# Patient Record
Sex: MALE | Race: BLACK OR AFRICAN AMERICAN | Employment: FULL TIME | URBAN - METROPOLITAN AREA
[De-identification: names, ages, dates, MRNs, and addresses within clinical notes are randomized per-mention and may not be internally consistent; named-entity substitution may affect disease eponyms.]

---

## 2017-06-22 ENCOUNTER — OCC HEALTH (OUTPATIENT)
Dept: OCCUPATIONAL MEDICINE | Age: 27
End: 2017-06-22
Attending: PHYSICIAN ASSISTANT

## 2020-05-14 ENCOUNTER — HOSPITAL ENCOUNTER (OUTPATIENT)
Age: 30
Discharge: HOME | End: 2020-05-14
Attending: FAMILY MEDICINE
Payer: COMMERCIAL

## 2020-05-14 VITALS
SYSTOLIC BLOOD PRESSURE: 134 MMHG | DIASTOLIC BLOOD PRESSURE: 78 MMHG | HEART RATE: 74 BPM | OXYGEN SATURATION: 98 % | TEMPERATURE: 98 F | RESPIRATION RATE: 16 BRPM

## 2020-05-14 DIAGNOSIS — K21.9 GASTROESOPHAGEAL REFLUX DISEASE, ESOPHAGITIS PRESENCE NOT SPECIFIED: Primary | ICD-10-CM

## 2020-05-14 PROCEDURE — 99204 OFFICE O/P NEW MOD 45 MIN: CPT

## 2020-05-14 PROCEDURE — 93005 ELECTROCARDIOGRAM TRACING: CPT

## 2020-05-14 PROCEDURE — 93010 ELECTROCARDIOGRAM REPORT: CPT

## 2020-05-14 RX ORDER — PANTOPRAZOLE SODIUM 40 MG/1
40 TABLET, DELAYED RELEASE ORAL DAILY
Qty: 30 TABLET | Refills: 0 | Status: SHIPPED | OUTPATIENT
Start: 2020-05-14 | End: 2020-06-13

## 2020-05-14 NOTE — ED INITIAL ASSESSMENT (HPI)
Complains of epigastric pain on and off for the last three days. Pain starts after eating. Denies shortness of breath or chest pain. Hx GERD.

## 2020-05-14 NOTE — ED PROVIDER NOTES
Patient Seen in: [facility]      History   Patient presents with:  Epigastric Pain    Stated Complaint: Sesation food stuck In esophagus    HPI  58-year-old gentleman presents with mild upper abdominal discomfort for 3 days now, normal.      Mouth/Throat:      Pharynx: No oropharyngeal exudate. Eyes:      General:         Right eye: No discharge. Left eye: No discharge.       Conjunctiva/sclera: Conjunctivae normal.      Pupils: Pupils are equal, round, and reactive to li Prescribed:  Current Discharge Medication List    START taking these medications    Pantoprazole Sodium 40 MG Oral Tab EC  Take 1 tablet (40 mg total) by mouth daily.   Qty: 30 tablet Refills: 0

## 2020-07-01 ENCOUNTER — OFFICE VISIT (OUTPATIENT)
Dept: INTERNAL MEDICINE CLINIC | Facility: CLINIC | Age: 30
End: 2020-07-01
Payer: COMMERCIAL

## 2020-07-01 VITALS
HEIGHT: 72 IN | RESPIRATION RATE: 18 BRPM | TEMPERATURE: 98 F | WEIGHT: 194 LBS | HEART RATE: 60 BPM | DIASTOLIC BLOOD PRESSURE: 76 MMHG | OXYGEN SATURATION: 97 % | SYSTOLIC BLOOD PRESSURE: 118 MMHG | BODY MASS INDEX: 26.28 KG/M2

## 2020-07-01 DIAGNOSIS — Z13.1 SCREENING FOR DIABETES MELLITUS: ICD-10-CM

## 2020-07-01 DIAGNOSIS — Z13.0 SCREENING FOR ENDOCRINE, NUTRITIONAL, METABOLIC AND IMMUNITY DISORDER: ICD-10-CM

## 2020-07-01 DIAGNOSIS — Z13.29 SCREENING FOR ENDOCRINE, NUTRITIONAL, METABOLIC AND IMMUNITY DISORDER: ICD-10-CM

## 2020-07-01 DIAGNOSIS — Z13.89 SCREENING FOR GENITOURINARY CONDITION: ICD-10-CM

## 2020-07-01 DIAGNOSIS — Z13.220 SCREENING FOR LIPID DISORDERS: ICD-10-CM

## 2020-07-01 DIAGNOSIS — M54.50 LUMBAR BACK PAIN: ICD-10-CM

## 2020-07-01 DIAGNOSIS — Z00.00 ANNUAL PHYSICAL EXAM: Primary | ICD-10-CM

## 2020-07-01 DIAGNOSIS — Z13.21 SCREENING FOR ENDOCRINE, NUTRITIONAL, METABOLIC AND IMMUNITY DISORDER: ICD-10-CM

## 2020-07-01 DIAGNOSIS — Z13.228 SCREENING FOR ENDOCRINE, NUTRITIONAL, METABOLIC AND IMMUNITY DISORDER: ICD-10-CM

## 2020-07-01 PROCEDURE — 99385 PREV VISIT NEW AGE 18-39: CPT | Performed by: NURSE PRACTITIONER

## 2020-07-01 RX ORDER — CYCLOBENZAPRINE HCL 10 MG
10 TABLET ORAL 3 TIMES DAILY PRN
Qty: 20 TABLET | Refills: 0 | Status: SHIPPED | OUTPATIENT
Start: 2020-07-01 | End: 2020-10-01

## 2020-07-01 RX ORDER — PREDNISONE 20 MG/1
40 TABLET ORAL DAILY
Qty: 14 TABLET | Refills: 0 | Status: SHIPPED | OUTPATIENT
Start: 2020-07-01 | End: 2020-07-08

## 2020-07-01 NOTE — PROGRESS NOTES
Wellness Exam    CC: Patient is presenting for a wellness exam    HPI:   Current Complaints: New to our office. Hypertension has been controlled. He is experiencing lumbar back pain after chiropractor did adjustment a week ago.  He has been taking OTC medic diarrhea. Genitourinary: Negative for urgency, frequency and difficulty urinating. Musculoskeletal: Negative for arthralgias and gait problem. Skin: Negative for color change and rash. Neurological: Negative for tremors, weakness and numbness.    He cancer screening, labs, safety, immunizations were discussed with the patient and ordered as follows:    Lumbar back pain- take prednisone as directed, use muscle relaxer as needed.  Follow up in 2 weeks    HTN- controlled, would like to come off medication

## 2020-07-16 ENCOUNTER — LAB ENCOUNTER (OUTPATIENT)
Dept: LAB | Age: 30
End: 2020-07-16
Attending: NURSE PRACTITIONER
Payer: COMMERCIAL

## 2020-07-16 DIAGNOSIS — Z13.0 SCREENING FOR ENDOCRINE, NUTRITIONAL, METABOLIC AND IMMUNITY DISORDER: ICD-10-CM

## 2020-07-16 DIAGNOSIS — Z13.89 SCREENING FOR GENITOURINARY CONDITION: ICD-10-CM

## 2020-07-16 DIAGNOSIS — Z13.228 SCREENING FOR ENDOCRINE, NUTRITIONAL, METABOLIC AND IMMUNITY DISORDER: ICD-10-CM

## 2020-07-16 DIAGNOSIS — Z13.29 SCREENING FOR ENDOCRINE, NUTRITIONAL, METABOLIC AND IMMUNITY DISORDER: ICD-10-CM

## 2020-07-16 DIAGNOSIS — Z13.1 SCREENING FOR DIABETES MELLITUS: ICD-10-CM

## 2020-07-16 DIAGNOSIS — Z13.21 SCREENING FOR ENDOCRINE, NUTRITIONAL, METABOLIC AND IMMUNITY DISORDER: ICD-10-CM

## 2020-07-16 DIAGNOSIS — Z13.220 SCREENING FOR LIPID DISORDERS: ICD-10-CM

## 2020-07-16 LAB
ALBUMIN SERPL-MCNC: 3.8 G/DL (ref 3.4–5)
ALBUMIN/GLOB SERPL: 1.2 {RATIO} (ref 1–2)
ALP LIVER SERPL-CCNC: 46 U/L (ref 45–117)
ALT SERPL-CCNC: 24 U/L (ref 16–61)
ANION GAP SERPL CALC-SCNC: 5 MMOL/L (ref 0–18)
AST SERPL-CCNC: 18 U/L (ref 15–37)
BASOPHILS # BLD AUTO: 0.03 X10(3) UL (ref 0–0.2)
BASOPHILS NFR BLD AUTO: 0.3 %
BILIRUB SERPL-MCNC: 1 MG/DL (ref 0.1–2)
BILIRUB UR QL STRIP.AUTO: NEGATIVE
BUN BLD-MCNC: 17 MG/DL (ref 7–18)
BUN/CREAT SERPL: 12.5 (ref 10–20)
CALCIUM BLD-MCNC: 8.6 MG/DL (ref 8.5–10.1)
CHLORIDE SERPL-SCNC: 101 MMOL/L (ref 98–112)
CHOLEST SMN-MCNC: 202 MG/DL (ref ?–200)
CLARITY UR REFRACT.AUTO: CLEAR
CO2 SERPL-SCNC: 30 MMOL/L (ref 21–32)
COLOR UR AUTO: YELLOW
CREAT BLD-MCNC: 1.36 MG/DL (ref 0.7–1.3)
DEPRECATED RDW RBC AUTO: 47.4 FL (ref 35.1–46.3)
EOSINOPHIL # BLD AUTO: 0.06 X10(3) UL (ref 0–0.7)
EOSINOPHIL NFR BLD AUTO: 0.6 %
ERYTHROCYTE [DISTWIDTH] IN BLOOD BY AUTOMATED COUNT: 14.6 % (ref 11–15)
EST. AVERAGE GLUCOSE BLD GHB EST-MCNC: 111 MG/DL (ref 68–126)
GLOBULIN PLAS-MCNC: 3.3 G/DL (ref 2.8–4.4)
GLUCOSE BLD-MCNC: 75 MG/DL (ref 70–99)
GLUCOSE UR STRIP.AUTO-MCNC: NEGATIVE MG/DL
HBA1C MFR BLD HPLC: 5.5 % (ref ?–5.7)
HCT VFR BLD AUTO: 46.7 % (ref 39–53)
HDLC SERPL-MCNC: 68 MG/DL (ref 40–59)
HGB BLD-MCNC: 14.7 G/DL (ref 13–17.5)
IMM GRANULOCYTES # BLD AUTO: 0.02 X10(3) UL (ref 0–1)
IMM GRANULOCYTES NFR BLD: 0.2 %
LDLC SERPL CALC-MCNC: 116 MG/DL (ref ?–100)
LEUKOCYTE ESTERASE UR QL STRIP.AUTO: NEGATIVE
LYMPHOCYTES # BLD AUTO: 3.14 X10(3) UL (ref 1–4)
LYMPHOCYTES NFR BLD AUTO: 31.6 %
M PROTEIN MFR SERPL ELPH: 7.1 G/DL (ref 6.4–8.2)
MCH RBC QN AUTO: 28.1 PG (ref 26–34)
MCHC RBC AUTO-ENTMCNC: 31.5 G/DL (ref 31–37)
MCV RBC AUTO: 89.3 FL (ref 80–100)
MONOCYTES # BLD AUTO: 0.76 X10(3) UL (ref 0.1–1)
MONOCYTES NFR BLD AUTO: 7.6 %
NEUTROPHILS # BLD AUTO: 5.93 X10 (3) UL (ref 1.5–7.7)
NEUTROPHILS # BLD AUTO: 5.93 X10(3) UL (ref 1.5–7.7)
NEUTROPHILS NFR BLD AUTO: 59.7 %
NITRITE UR QL STRIP.AUTO: NEGATIVE
NONHDLC SERPL-MCNC: 134 MG/DL (ref ?–130)
OSMOLALITY SERPL CALC.SUM OF ELEC: 282 MOSM/KG (ref 275–295)
PATIENT FASTING Y/N/NP: YES
PATIENT FASTING Y/N/NP: YES
PH UR STRIP.AUTO: 5 [PH] (ref 4.5–8)
PLATELET # BLD AUTO: 292 10(3)UL (ref 150–450)
POTASSIUM SERPL-SCNC: 3.7 MMOL/L (ref 3.5–5.1)
PROT UR STRIP.AUTO-MCNC: NEGATIVE MG/DL
RBC # BLD AUTO: 5.23 X10(6)UL (ref 4.3–5.7)
RBC UR QL AUTO: NEGATIVE
SODIUM SERPL-SCNC: 136 MMOL/L (ref 136–145)
SP GR UR STRIP.AUTO: 1.02 (ref 1–1.03)
TRIGL SERPL-MCNC: 89 MG/DL (ref 30–149)
TSI SER-ACNC: 2.17 MIU/ML (ref 0.36–3.74)
UROBILINOGEN UR STRIP.AUTO-MCNC: <2 MG/DL
VLDLC SERPL CALC-MCNC: 18 MG/DL (ref 0–30)
WBC # BLD AUTO: 9.9 X10(3) UL (ref 4–11)

## 2020-07-16 PROCEDURE — 36415 COLL VENOUS BLD VENIPUNCTURE: CPT | Performed by: NURSE PRACTITIONER

## 2020-07-16 PROCEDURE — 80050 GENERAL HEALTH PANEL: CPT | Performed by: NURSE PRACTITIONER

## 2020-07-16 PROCEDURE — 80061 LIPID PANEL: CPT | Performed by: NURSE PRACTITIONER

## 2020-07-16 PROCEDURE — 81003 URINALYSIS AUTO W/O SCOPE: CPT | Performed by: NURSE PRACTITIONER

## 2020-07-16 PROCEDURE — 83036 HEMOGLOBIN GLYCOSYLATED A1C: CPT | Performed by: NURSE PRACTITIONER

## 2020-10-01 ENCOUNTER — OFFICE VISIT (OUTPATIENT)
Dept: INTERNAL MEDICINE CLINIC | Facility: CLINIC | Age: 30
End: 2020-10-01
Payer: COMMERCIAL

## 2020-10-01 VITALS
HEART RATE: 72 BPM | SYSTOLIC BLOOD PRESSURE: 128 MMHG | TEMPERATURE: 97 F | HEIGHT: 72 IN | OXYGEN SATURATION: 100 % | DIASTOLIC BLOOD PRESSURE: 82 MMHG | WEIGHT: 189 LBS | BODY MASS INDEX: 25.6 KG/M2 | RESPIRATION RATE: 16 BRPM

## 2020-10-01 DIAGNOSIS — K62.89 RECTAL PAIN: Primary | ICD-10-CM

## 2020-10-01 DIAGNOSIS — Z28.21 PNEUMOCOCCAL VACCINATION DECLINED BY PATIENT: ICD-10-CM

## 2020-10-01 DIAGNOSIS — Z28.21 INFLUENZA VACCINATION DECLINED BY PATIENT: ICD-10-CM

## 2020-10-01 PROCEDURE — 3008F BODY MASS INDEX DOCD: CPT | Performed by: INTERNAL MEDICINE

## 2020-10-01 PROCEDURE — 3074F SYST BP LT 130 MM HG: CPT | Performed by: INTERNAL MEDICINE

## 2020-10-01 PROCEDURE — 99214 OFFICE O/P EST MOD 30 MIN: CPT | Performed by: INTERNAL MEDICINE

## 2020-10-01 PROCEDURE — 3079F DIAST BP 80-89 MM HG: CPT | Performed by: INTERNAL MEDICINE

## 2020-10-01 NOTE — PATIENT INSTRUCTIONS
Anatomy of the Digestive System    Food gives the body the energy needed for life. The digestive system breaks food down into basic nutrients that can be used by the body.  The digestive tract is a long, muscular tube that extends from the mouth through t

## 2020-10-01 NOTE — PROGRESS NOTES
HPI:    Patient ID: Yesenia Mancilla is a 31year old male. Rectal Pain  This is a new problem. Episode onset: 1 week. The problem occurs intermittently. The problem has been waxing and waning.  Pertinent negatives include no abdominal pain, change in Soft. Bowel sounds are normal. He exhibits no distension. There is no abdominal tenderness. There is no rebound. Genitourinary: Rectum:      No rectal mass, tenderness, internal hemorrhoid or abnormal anal tone. Skin: He is not diaphoretic.    Nursing

## 2020-11-20 ENCOUNTER — HOSPITAL ENCOUNTER (OUTPATIENT)
Age: 30
Discharge: HOME | End: 2020-11-20
Attending: EMERGENCY MEDICINE
Payer: COMMERCIAL

## 2020-11-20 VITALS
HEART RATE: 57 BPM | SYSTOLIC BLOOD PRESSURE: 127 MMHG | TEMPERATURE: 99 F | DIASTOLIC BLOOD PRESSURE: 65 MMHG | RESPIRATION RATE: 15 BRPM | OXYGEN SATURATION: 98 %

## 2020-11-20 DIAGNOSIS — K64.9 HEMORRHOIDS, UNSPECIFIED HEMORRHOID TYPE: Primary | ICD-10-CM

## 2020-11-20 PROCEDURE — 82272 OCCULT BLD FECES 1-3 TESTS: CPT

## 2020-11-20 PROCEDURE — 99214 OFFICE O/P EST MOD 30 MIN: CPT

## 2020-11-20 PROCEDURE — 99213 OFFICE O/P EST LOW 20 MIN: CPT

## 2020-11-20 PROCEDURE — 82272 OCCULT BLD FECES 1-3 TESTS: CPT | Performed by: EMERGENCY MEDICINE

## 2020-11-20 RX ORDER — HYDROCORTISONE ACETATE PRAMOXINE HCL 2.5; 1 G/100G; G/100G
1 CREAM TOPICAL 3 TIMES DAILY
Qty: 30 G | Refills: 0 | Status: SHIPPED | OUTPATIENT
Start: 2020-11-20 | End: 2022-01-17 | Stop reason: ALTCHOICE

## 2020-11-20 RX ORDER — DOCUSATE SODIUM 100 MG/1
100 CAPSULE, LIQUID FILLED ORAL 2 TIMES DAILY
Qty: 60 CAPSULE | Refills: 0 | Status: SHIPPED | OUTPATIENT
Start: 2020-11-20 | End: 2020-12-20

## 2020-11-20 RX ORDER — DIBUCAINE 0.28 G/28G
1 OINTMENT TOPICAL 3 TIMES DAILY
Qty: 28 G | Refills: 0 | Status: SHIPPED | OUTPATIENT
Start: 2020-11-20 | End: 2020-11-30

## 2020-11-21 NOTE — ED INITIAL ASSESSMENT (HPI)
Pt has a \"growth \" on the left side of his anus,   Small amount or red blood \"watered down\" when he wipes.   He concerned about an abscess or fistula, but may just be a hemorrhoid

## 2020-11-21 NOTE — ED PROVIDER NOTES
Patient Seen in: [facility]      History   Patient presents with:  Anal Problem    Stated Complaint: discomfort in lower abd area x 4 days    HPI    The patient is a 18-year-old male with a history of hemorrhoids back in his 25s, because Current:/65   Pulse 57   Temp 98.8 °F (37.1 °C) (Temporal)   Resp 15   SpO2 98%         Physical Exam    General: Comfortable and well appearing. Alert and oriented in no distress.   Neuro: Grossly normal and symmetric motor strength and sensati [id number] [phone]    Call in 3 days            Medications Prescribed:  Current Discharge Medication List    START taking these medications    Hydrocort-Pramoxine, Perianal, 2.5-1 % External Cream  Apply 1 Application topically 3 (three) times daily

## 2021-01-13 ENCOUNTER — OFFICE VISIT (OUTPATIENT)
Dept: SURGERY | Facility: CLINIC | Age: 31
End: 2021-01-13
Payer: COMMERCIAL

## 2021-01-13 VITALS
HEART RATE: 73 BPM | BODY MASS INDEX: 23.7 KG/M2 | SYSTOLIC BLOOD PRESSURE: 121 MMHG | WEIGHT: 175 LBS | TEMPERATURE: 98 F | DIASTOLIC BLOOD PRESSURE: 73 MMHG | HEIGHT: 72 IN

## 2021-01-13 DIAGNOSIS — K62.89 PERIANAL PAIN: Primary | ICD-10-CM

## 2021-01-13 PROCEDURE — 3078F DIAST BP <80 MM HG: CPT | Performed by: SURGERY

## 2021-01-13 PROCEDURE — 3074F SYST BP LT 130 MM HG: CPT | Performed by: SURGERY

## 2021-01-13 PROCEDURE — 3008F BODY MASS INDEX DOCD: CPT | Performed by: SURGERY

## 2021-01-13 PROCEDURE — 99243 OFF/OP CNSLTJ NEW/EST LOW 30: CPT | Performed by: SURGERY

## 2021-01-13 NOTE — H&P
Trinity Murray is a 31year old male  Patient presents with:  Hemorrhoids: NP referred by Dr. Paris Gallagher for internal hemorrhoids - Pt. states Dr. Paris Gallagher stated that the hemorrhoids were more complicated than ususal that could require surgery.  Pt. c/o pain (s syndrome) Sister      Social History    Tobacco Use      Smoking status: Former Smoker      Smokeless tobacco: Never Used    Alcohol use: Not Currently    Drug use: Yes      Types: Cannabis      Comment: Vapes marijuana      ROS     GENERAL HEALTH: otherwi not confidently feel a chronic fissure.   STUDIES:     Admission on 11/20/2020, Discharged on 11/20/2020   Component Date Value Ref Range Status   • Hemoccult 11/20/2020 Negative  Negative Final       ASSESSMENT   Imp: Perianal pain and history consistent w

## 2021-03-03 ENCOUNTER — OFFICE VISIT (OUTPATIENT)
Dept: SURGERY | Facility: CLINIC | Age: 31
End: 2021-03-03
Payer: COMMERCIAL

## 2021-03-03 VITALS — DIASTOLIC BLOOD PRESSURE: 71 MMHG | HEART RATE: 83 BPM | TEMPERATURE: 97 F | SYSTOLIC BLOOD PRESSURE: 122 MMHG

## 2021-03-03 DIAGNOSIS — K62.89 PERIANAL PAIN: Primary | ICD-10-CM

## 2021-03-03 PROCEDURE — 3074F SYST BP LT 130 MM HG: CPT | Performed by: SURGERY

## 2021-03-03 PROCEDURE — 3078F DIAST BP <80 MM HG: CPT | Performed by: SURGERY

## 2021-03-03 PROCEDURE — 99214 OFFICE O/P EST MOD 30 MIN: CPT | Performed by: SURGERY

## 2021-03-03 NOTE — PROGRESS NOTES
Office Visit Note       Active Problems      No diagnosis found. Chief Complaint   Patient presents with:  Hemorrhoids: est pt - 1 mo f/u hemorrhoid -- States better, but still discomfort. Denies draining or rectal bleeding.  Denies difficulty having B History    Tobacco Use      Smoking status: Former Smoker      Smokeless tobacco: Never Used    Alcohol use: Not Currently    Drug use: Yes      Types: Cannabis      Comment: Vapes marijuana       •  Hydrocort-Pramoxine, Perianal, 2.5-1 % External Cream, A Cardiovascular: Normal rate and regular rhythm. Pulmonary/Chest: Effort normal and breath sounds normal.   Abdominal: Soft. Bowel sounds are normal. He exhibits no distension. There is no abdominal tenderness. There is no rebound.    Musculoskeletal: No

## 2021-05-08 NOTE — ED QUICK NOTES
Report received from Reginald lacy RN. Patient is sleeping on cot in no apparent distress. Patient's breathing is at ease. No needs are expressed at this time.

## 2021-05-08 NOTE — ED QUICK NOTES
Male PCT and this PCT went into patients room to have patient provide urine sample. Patient unable to keep eyes open to provide sample. RN and MD notified.

## 2021-05-08 NOTE — ED PROVIDER NOTES
Patient Seen in: [facility] Emergency Department      History   Patient presents with:  Nausea/Vomiting/Diarrhea  Alcohol Intoxication    Stated Complaint: Vomiting, ETOH    HPI/Subjective:   HPI    69-year-old male presents for evaluation after iveth Exam    General: Lethargic, follows commands, moans of alcohol, extensive vomitus on clothing  HEENT: Atraumatic, normocephalic. Pupils equal reactive. Extraocular motions intact. Neck: Supple  Lungs: Clear to auscultation bilaterally.   Heart: Regular Bag 5/8/21 0214)   Potassium Chloride ER (K-DUR M20) CR tab 40 mEq (40 mEq Oral Given 5/8/21 0310)          Medications   glucose (DEX4) oral liquid 15 g (15 g Oral Given 5/8/21 0311)     Or   dextrose 50 % injection 50 mL ( Intravenous See Alternative 5/8

## 2021-05-08 NOTE — ED NOTES
Awake and alert, clinically sober. Attempting to find a ride home.   Well-appearing Episodes of diarrhea (more than 3 times a day), or if you are unable to tolerate food or fluids

## 2021-05-08 NOTE — ED INITIAL ASSESSMENT (HPI)
Pt arrived via EMS after being found in car with vomit on himself, pt sleeping, stated had 2 drinks tonight. Pt states does not remember what happened tonight or why he was sleeping in car.

## 2021-05-08 NOTE — ED QUICK NOTES
Patient reports his cell phone was in his pants pocket and his pants are not in the bag of belongings handed back to him.  Oilvia Gupta RN, who arrived patient to the ER this morning and she reports patient had vomited on himself, soaking through his pant

## 2021-09-27 ENCOUNTER — APPOINTMENT (OUTPATIENT)
Dept: GENERAL RADIOLOGY | Age: 31
End: 2021-09-27
Attending: EMERGENCY MEDICINE
Payer: COMMERCIAL

## 2021-09-27 ENCOUNTER — HOSPITAL ENCOUNTER (OUTPATIENT)
Age: 31
Discharge: HOME | End: 2021-09-27
Attending: EMERGENCY MEDICINE
Payer: COMMERCIAL

## 2021-09-27 VITALS
BODY MASS INDEX: 26.41 KG/M2 | TEMPERATURE: 98 F | WEIGHT: 195 LBS | HEART RATE: 64 BPM | OXYGEN SATURATION: 97 % | RESPIRATION RATE: 18 BRPM | HEIGHT: 72 IN | DIASTOLIC BLOOD PRESSURE: 75 MMHG | SYSTOLIC BLOOD PRESSURE: 127 MMHG

## 2021-09-27 DIAGNOSIS — S90.122A CONTUSION OF LESSER TOE OF LEFT FOOT WITHOUT DAMAGE TO NAIL, INITIAL ENCOUNTER: Primary | ICD-10-CM

## 2021-09-27 PROCEDURE — 99213 OFFICE O/P EST LOW 20 MIN: CPT

## 2021-09-27 PROCEDURE — 73660 X-RAY EXAM OF TOE(S): CPT | Performed by: EMERGENCY MEDICINE

## 2021-09-27 NOTE — ED PROVIDER NOTES
Patient Seen in: [facility]      History   Patient presents with:   Toe Injury    Stated Complaint: left foot toe injury    Subjective:   HPI    39-year-old male stated that he accidentally dropped a drink bottle on his left foot sustainin (MIN 2 VIEWS), LEFT 4TH (CPT=73660)    Result Date: 9/27/2021  PROCEDURE:  XR TOE(S) (MIN 2 VIEWS), LEFT 4TH (CPT=73660)  TECHNIQUE:  Three views were obtained. COMPARISON:  None.   INDICATIONS:  left foot toe injury  PATIENT STATED HISTORY: (As transcribe

## 2021-11-10 ENCOUNTER — OFFICE VISIT (OUTPATIENT)
Dept: SURGERY | Facility: CLINIC | Age: 31
End: 2021-11-10
Payer: COMMERCIAL

## 2021-11-10 VITALS
DIASTOLIC BLOOD PRESSURE: 78 MMHG | BODY MASS INDEX: 26.41 KG/M2 | HEART RATE: 64 BPM | SYSTOLIC BLOOD PRESSURE: 128 MMHG | HEIGHT: 72 IN | TEMPERATURE: 98 F | WEIGHT: 195 LBS

## 2021-11-10 DIAGNOSIS — K62.5 RECTAL BLEEDING: Primary | ICD-10-CM

## 2021-11-10 DIAGNOSIS — K60.2 ANAL FISSURE: ICD-10-CM

## 2021-11-10 PROCEDURE — 3078F DIAST BP <80 MM HG: CPT | Performed by: SURGERY

## 2021-11-10 PROCEDURE — 99244 OFF/OP CNSLTJ NEW/EST MOD 40: CPT | Performed by: SURGERY

## 2021-11-10 PROCEDURE — 3074F SYST BP LT 130 MM HG: CPT | Performed by: SURGERY

## 2021-11-10 PROCEDURE — 3008F BODY MASS INDEX DOCD: CPT | Performed by: SURGERY

## 2021-11-10 NOTE — H&P
New Patient Visit Note       Active Problems      No diagnosis found. Chief Complaint   Patient presents with:  Anal Problem: Anal Fissure c/o rectal burning an ditching on and off.  He denies pain or bleeding       History of Present Illness   Patient i Outpatient Medications:   •  Hydrocort-Pramoxine, Perianal, 2.5-1 % External Cream, Apply 1 Application topically 3 (three) times daily. , Disp: 30 g, Rfl: 0  •  NIFEdipine ER Osmotic (PROCARDIA XL) 90 MG Oral Tablet 24 Hr, TAKE 1 TABLET BY MOUTH EVERY DAY, exquisitely tender to tolerate exam no external evidence of hemorrhoid      Assessment   Anal pain rule out fissure and rectal bleeding    Plan   Colonoscopy first rule out rectal tumor or polyp will try to examine the anal canal at time of colonoscopy  Fo

## 2021-11-19 RX ORDER — VALSARTAN 320 MG/1
320 TABLET ORAL DAILY
COMMUNITY

## 2021-11-27 ENCOUNTER — EKG ENCOUNTER (OUTPATIENT)
Dept: LAB | Age: 31
End: 2021-11-27
Payer: COMMERCIAL

## 2021-11-27 ENCOUNTER — LAB ENCOUNTER (OUTPATIENT)
Dept: LAB | Age: 31
End: 2021-11-27
Attending: SURGERY
Payer: COMMERCIAL

## 2021-11-27 DIAGNOSIS — Z01.812 ENCOUNTER FOR PREPROCEDURE SCREENING LABORATORY TESTING FOR COVID-19: ICD-10-CM

## 2021-11-27 DIAGNOSIS — K60.2 ANAL FISSURE: ICD-10-CM

## 2021-11-27 DIAGNOSIS — Z20.822 ENCOUNTER FOR PREPROCEDURE SCREENING LABORATORY TESTING FOR COVID-19: ICD-10-CM

## 2021-11-27 PROCEDURE — 36415 COLL VENOUS BLD VENIPUNCTURE: CPT

## 2021-11-27 PROCEDURE — 93010 ELECTROCARDIOGRAM REPORT: CPT | Performed by: INTERNAL MEDICINE

## 2021-11-27 PROCEDURE — 93005 ELECTROCARDIOGRAM TRACING: CPT

## 2021-11-27 PROCEDURE — 80048 BASIC METABOLIC PNL TOTAL CA: CPT

## 2021-11-29 ENCOUNTER — HOSPITAL ENCOUNTER (OUTPATIENT)
Facility: HOSPITAL | Age: 31
Setting detail: HOSPITAL OUTPATIENT SURGERY
Discharge: HOME | End: 2021-11-29
Attending: SURGERY | Admitting: SURGERY
Payer: COMMERCIAL

## 2021-11-29 ENCOUNTER — ANESTHESIA (OUTPATIENT)
Dept: ENDOSCOPY | Facility: HOSPITAL | Age: 31
End: 2021-11-29
Payer: COMMERCIAL

## 2021-11-29 ENCOUNTER — ANESTHESIA EVENT (OUTPATIENT)
Dept: ENDOSCOPY | Facility: HOSPITAL | Age: 31
End: 2021-11-29
Payer: COMMERCIAL

## 2021-11-29 VITALS
WEIGHT: 195 LBS | DIASTOLIC BLOOD PRESSURE: 103 MMHG | HEIGHT: 72 IN | HEART RATE: 73 BPM | OXYGEN SATURATION: 99 % | BODY MASS INDEX: 26.41 KG/M2 | TEMPERATURE: 97 F | RESPIRATION RATE: 16 BRPM | SYSTOLIC BLOOD PRESSURE: 138 MMHG

## 2021-11-29 DIAGNOSIS — Z20.822 ENCOUNTER FOR PREPROCEDURE SCREENING LABORATORY TESTING FOR COVID-19: Primary | ICD-10-CM

## 2021-11-29 DIAGNOSIS — K62.5 RECTAL BLEEDING: ICD-10-CM

## 2021-11-29 DIAGNOSIS — Z01.812 ENCOUNTER FOR PREPROCEDURE SCREENING LABORATORY TESTING FOR COVID-19: Primary | ICD-10-CM

## 2021-11-29 PROCEDURE — 0DJD8ZZ INSPECTION OF LOWER INTESTINAL TRACT, VIA NATURAL OR ARTIFICIAL OPENING ENDOSCOPIC: ICD-10-PCS | Performed by: SURGERY

## 2021-11-29 RX ORDER — SODIUM CHLORIDE, SODIUM LACTATE, POTASSIUM CHLORIDE, CALCIUM CHLORIDE 600; 310; 30; 20 MG/100ML; MG/100ML; MG/100ML; MG/100ML
INJECTION, SOLUTION INTRAVENOUS CONTINUOUS
Status: DISCONTINUED | OUTPATIENT
Start: 2021-11-29 | End: 2021-11-29

## 2021-11-29 RX ORDER — LIDOCAINE HYDROCHLORIDE 10 MG/ML
INJECTION, SOLUTION EPIDURAL; INFILTRATION; INTRACAUDAL; PERINEURAL AS NEEDED
Status: DISCONTINUED | OUTPATIENT
Start: 2021-11-29 | End: 2021-11-29 | Stop reason: SURG

## 2021-11-29 RX ADMIN — LIDOCAINE HYDROCHLORIDE 50 MG: 10 INJECTION, SOLUTION EPIDURAL; INFILTRATION; INTRACAUDAL; PERINEURAL at 15:42:00

## 2021-11-29 RX ADMIN — SODIUM CHLORIDE, SODIUM LACTATE, POTASSIUM CHLORIDE, CALCIUM CHLORIDE: 600; 310; 30; 20 INJECTION, SOLUTION INTRAVENOUS at 16:01:00

## 2021-11-29 NOTE — ANESTHESIA PREPROCEDURE EVALUATION
PRE-OP EVALUATION    Patient Name: Reed David    Admit Diagnosis: Rectal bleeding [K62.5]    Pre-op Diagnosis: Rectal bleeding [K62.5]    COLONOSCOPY    Anesthesia Procedure: COLONOSCOPY (N/A )    Surgeon(s) and Role:     Krista Dickens,  - Pr and patient meets guidelines. Patient has taken beta blockers in last 24 hours.         Plan/risks discussed with: patient                Present on Admission:  **None**

## 2021-11-29 NOTE — ANESTHESIA POSTPROCEDURE EVALUATION
[facility] Patient Status:  Hospital Outpatient Surgery   Age/Gender 27year old male MRN [id number]   Location [id number] [place] [address] Attending Mauricio Agarwal, [address] [facility] Day # 0 PCP MD Brandon Magdaleno

## 2021-11-29 NOTE — H&P
Patient is seen me on 2 prior occasions. He was advised to undergo colonoscopy and anal exam under anesthesia with possible lateral sphincterotomy.   Patient now presents again with new complaint of rectal bleeding states he is concerned that he may have c Tablet 24 Hr, TAKE 1 TABLET BY MOUTH EVERY DAY, Disp: 90 tablet, Rfl: 0  •  Valsartan-Hydrochlorothiazide (DIOVAN HCT) 320-25 MG Oral Tab, Take 1 tablet by mouth daily. , Disp: 90 tablet, Rfl: 0      Review of Systems  The Review of Systems has been reviewe the anal canal at time of colonoscopy  Follow with anal exam under anesthesia possible lateral sphincterotomy discussed with patient postoperative expectations following lateral sphincterotomy  No orders of the defined types were placed in this encounter.

## 2021-11-30 NOTE — OPERATIVE REPORT
[address]    PATIENT'S NAME: Jairon Negrito RODRIGUES   ATTENDING PHYSICIAN: Ada Francis D.O.   OPERATING PHYSICIAN: Ada Francis D.O.   PATIENT ACCOUNT#:   [de-identified]    LOCATION:  ENDO [facility] ENDO POOL ROOMS 20 EDWP [address] #:   [id number]

## 2021-12-01 RX ORDER — ACETAMINOPHEN 500 MG
1000 TABLET ORAL ONCE
Status: CANCELLED | OUTPATIENT
Start: 2021-12-01 | End: 2021-12-01

## 2021-12-06 ENCOUNTER — HOSPITAL ENCOUNTER (OUTPATIENT)
Facility: HOSPITAL | Age: 31
Setting detail: HOSPITAL OUTPATIENT SURGERY
Discharge: HOME | End: 2021-12-06
Attending: SURGERY | Admitting: SURGERY
Payer: COMMERCIAL

## 2021-12-06 ENCOUNTER — ANESTHESIA (OUTPATIENT)
Dept: SURGERY | Facility: HOSPITAL | Age: 31
End: 2021-12-06
Payer: COMMERCIAL

## 2021-12-06 ENCOUNTER — ANESTHESIA EVENT (OUTPATIENT)
Dept: SURGERY | Facility: HOSPITAL | Age: 31
End: 2021-12-06
Payer: COMMERCIAL

## 2021-12-06 VITALS
HEART RATE: 58 BPM | HEIGHT: 72 IN | DIASTOLIC BLOOD PRESSURE: 78 MMHG | RESPIRATION RATE: 16 BRPM | BODY MASS INDEX: 25.08 KG/M2 | OXYGEN SATURATION: 100 % | WEIGHT: 185.19 LBS | TEMPERATURE: 97 F | SYSTOLIC BLOOD PRESSURE: 146 MMHG

## 2021-12-06 DIAGNOSIS — Z20.822 ENCOUNTER FOR PREPROCEDURE SCREENING LABORATORY TESTING FOR COVID-19: Primary | ICD-10-CM

## 2021-12-06 DIAGNOSIS — Z01.812 ENCOUNTER FOR PREPROCEDURE SCREENING LABORATORY TESTING FOR COVID-19: Primary | ICD-10-CM

## 2021-12-06 DIAGNOSIS — K60.2 ANAL FISSURE: ICD-10-CM

## 2021-12-06 PROCEDURE — 88305 TISSUE EXAM BY PATHOLOGIST: CPT | Performed by: SURGERY

## 2021-12-06 PROCEDURE — 0DBQ8ZX EXCISION OF ANUS, VIA NATURAL OR ARTIFICIAL OPENING ENDOSCOPIC, DIAGNOSTIC: ICD-10-PCS | Performed by: SURGERY

## 2021-12-06 RX ORDER — BUPIVACAINE HYDROCHLORIDE AND EPINEPHRINE 5; 5 MG/ML; UG/ML
INJECTION, SOLUTION EPIDURAL; INTRACAUDAL; PERINEURAL AS NEEDED
Status: DISCONTINUED | OUTPATIENT
Start: 2021-12-06 | End: 2021-12-06 | Stop reason: HOSPADM

## 2021-12-06 RX ORDER — SODIUM CHLORIDE, SODIUM LACTATE, POTASSIUM CHLORIDE, CALCIUM CHLORIDE 600; 310; 30; 20 MG/100ML; MG/100ML; MG/100ML; MG/100ML
INJECTION, SOLUTION INTRAVENOUS CONTINUOUS
Status: DISCONTINUED | OUTPATIENT
Start: 2021-12-06 | End: 2021-12-06

## 2021-12-06 RX ORDER — MEPERIDINE HYDROCHLORIDE 25 MG/ML
12.5 INJECTION INTRAMUSCULAR; INTRAVENOUS; SUBCUTANEOUS AS NEEDED
Status: DISCONTINUED | OUTPATIENT
Start: 2021-12-06 | End: 2021-12-06

## 2021-12-06 RX ORDER — ONDANSETRON 2 MG/ML
INJECTION INTRAMUSCULAR; INTRAVENOUS AS NEEDED
Status: DISCONTINUED | OUTPATIENT
Start: 2021-12-06 | End: 2021-12-06 | Stop reason: SURG

## 2021-12-06 RX ORDER — MIDAZOLAM HYDROCHLORIDE 1 MG/ML
1 INJECTION INTRAMUSCULAR; INTRAVENOUS EVERY 5 MIN PRN
Status: DISCONTINUED | OUTPATIENT
Start: 2021-12-06 | End: 2021-12-06

## 2021-12-06 RX ORDER — LABETALOL HYDROCHLORIDE 5 MG/ML
5 INJECTION, SOLUTION INTRAVENOUS EVERY 5 MIN PRN
Status: DISCONTINUED | OUTPATIENT
Start: 2021-12-06 | End: 2021-12-06

## 2021-12-06 RX ORDER — LIDOCAINE HYDROCHLORIDE 10 MG/ML
INJECTION, SOLUTION EPIDURAL; INFILTRATION; INTRACAUDAL; PERINEURAL AS NEEDED
Status: DISCONTINUED | OUTPATIENT
Start: 2021-12-06 | End: 2021-12-06 | Stop reason: SURG

## 2021-12-06 RX ORDER — HYDROCODONE BITARTRATE AND ACETAMINOPHEN 5; 325 MG/1; MG/1
2 TABLET ORAL AS NEEDED
Status: DISCONTINUED | OUTPATIENT
Start: 2021-12-06 | End: 2021-12-06

## 2021-12-06 RX ORDER — ONDANSETRON 2 MG/ML
4 INJECTION INTRAMUSCULAR; INTRAVENOUS AS NEEDED
Status: DISCONTINUED | OUTPATIENT
Start: 2021-12-06 | End: 2021-12-06

## 2021-12-06 RX ORDER — METOCLOPRAMIDE HYDROCHLORIDE 5 MG/ML
INJECTION INTRAMUSCULAR; INTRAVENOUS AS NEEDED
Status: DISCONTINUED | OUTPATIENT
Start: 2021-12-06 | End: 2021-12-06 | Stop reason: SURG

## 2021-12-06 RX ORDER — ROCURONIUM BROMIDE 10 MG/ML
INJECTION, SOLUTION INTRAVENOUS AS NEEDED
Status: DISCONTINUED | OUTPATIENT
Start: 2021-12-06 | End: 2021-12-06 | Stop reason: SURG

## 2021-12-06 RX ORDER — ESMOLOL HYDROCHLORIDE 10 MG/ML
INJECTION INTRAVENOUS AS NEEDED
Status: DISCONTINUED | OUTPATIENT
Start: 2021-12-06 | End: 2021-12-06 | Stop reason: SURG

## 2021-12-06 RX ORDER — ALBUTEROL SULFATE 2.5 MG/3ML
2.5 SOLUTION RESPIRATORY (INHALATION) AS NEEDED
Status: DISCONTINUED | OUTPATIENT
Start: 2021-12-06 | End: 2021-12-06

## 2021-12-06 RX ORDER — DIBUCAINE 0.28 G/28G
1 OINTMENT TOPICAL AS NEEDED
Qty: 1 EACH | Refills: 0 | Status: SHIPPED | OUTPATIENT
Start: 2021-12-06 | End: 2022-01-17 | Stop reason: ALTCHOICE

## 2021-12-06 RX ORDER — DEXAMETHASONE SODIUM PHOSPHATE 4 MG/ML
VIAL (ML) INJECTION AS NEEDED
Status: DISCONTINUED | OUTPATIENT
Start: 2021-12-06 | End: 2021-12-06 | Stop reason: SURG

## 2021-12-06 RX ORDER — MIDAZOLAM HYDROCHLORIDE 1 MG/ML
INJECTION INTRAMUSCULAR; INTRAVENOUS AS NEEDED
Status: DISCONTINUED | OUTPATIENT
Start: 2021-12-06 | End: 2021-12-06 | Stop reason: SURG

## 2021-12-06 RX ORDER — DIBUCAINE 0.28 G/28G
OINTMENT TOPICAL AS NEEDED
Status: DISCONTINUED | OUTPATIENT
Start: 2021-12-06 | End: 2021-12-06 | Stop reason: HOSPADM

## 2021-12-06 RX ORDER — NALOXONE HYDROCHLORIDE 0.4 MG/ML
80 INJECTION, SOLUTION INTRAMUSCULAR; INTRAVENOUS; SUBCUTANEOUS AS NEEDED
Status: DISCONTINUED | OUTPATIENT
Start: 2021-12-06 | End: 2021-12-06

## 2021-12-06 RX ORDER — HYDROCODONE BITARTRATE AND ACETAMINOPHEN 5; 325 MG/1; MG/1
1 TABLET ORAL AS NEEDED
Status: DISCONTINUED | OUTPATIENT
Start: 2021-12-06 | End: 2021-12-06

## 2021-12-06 RX ORDER — ACETAMINOPHEN 500 MG
1000 TABLET ORAL EVERY 6 HOURS PRN
COMMUNITY

## 2021-12-06 RX ORDER — HYDROMORPHONE HYDROCHLORIDE 1 MG/ML
0.4 INJECTION, SOLUTION INTRAMUSCULAR; INTRAVENOUS; SUBCUTANEOUS EVERY 5 MIN PRN
Status: DISCONTINUED | OUTPATIENT
Start: 2021-12-06 | End: 2021-12-06

## 2021-12-06 RX ORDER — HYDROCODONE BITARTRATE AND ACETAMINOPHEN 5; 325 MG/1; MG/1
1-2 TABLET ORAL EVERY 4 HOURS PRN
Qty: 30 TABLET | Refills: 0 | Status: SHIPPED | OUTPATIENT
Start: 2021-12-06 | End: 2022-01-05 | Stop reason: ALTCHOICE

## 2021-12-06 RX ORDER — HEPARIN SODIUM 5000 [USP'U]/ML
5000 INJECTION, SOLUTION INTRAVENOUS; SUBCUTANEOUS ONCE
Status: COMPLETED | OUTPATIENT
Start: 2021-12-06 | End: 2021-12-06

## 2021-12-06 RX ADMIN — ROCURONIUM BROMIDE 50 MG: 10 INJECTION, SOLUTION INTRAVENOUS at 16:50:00

## 2021-12-06 RX ADMIN — METOCLOPRAMIDE HYDROCHLORIDE 10 MG: 5 INJECTION INTRAMUSCULAR; INTRAVENOUS at 16:42:00

## 2021-12-06 RX ADMIN — ESMOLOL HYDROCHLORIDE 50 MG: 10 INJECTION INTRAVENOUS at 16:42:00

## 2021-12-06 RX ADMIN — ONDANSETRON 4 MG: 2 INJECTION INTRAMUSCULAR; INTRAVENOUS at 17:10:00

## 2021-12-06 RX ADMIN — SODIUM CHLORIDE, SODIUM LACTATE, POTASSIUM CHLORIDE, CALCIUM CHLORIDE: 600; 310; 30; 20 INJECTION, SOLUTION INTRAVENOUS at 17:37:00

## 2021-12-06 RX ADMIN — ROCURONIUM BROMIDE 10 MG: 10 INJECTION, SOLUTION INTRAVENOUS at 16:42:00

## 2021-12-06 RX ADMIN — MIDAZOLAM HYDROCHLORIDE 2 MG: 1 INJECTION INTRAMUSCULAR; INTRAVENOUS at 16:35:00

## 2021-12-06 RX ADMIN — ONDANSETRON 4 MG: 2 INJECTION INTRAMUSCULAR; INTRAVENOUS at 16:42:00

## 2021-12-06 RX ADMIN — DEXAMETHASONE SODIUM PHOSPHATE 4 MG: 4 MG/ML VIAL (ML) INJECTION at 16:42:00

## 2021-12-06 RX ADMIN — LIDOCAINE HYDROCHLORIDE 50 MG: 10 INJECTION, SOLUTION EPIDURAL; INFILTRATION; INTRACAUDAL; PERINEURAL at 16:42:00

## 2021-12-06 RX ADMIN — SODIUM CHLORIDE, SODIUM LACTATE, POTASSIUM CHLORIDE, CALCIUM CHLORIDE: 600; 310; 30; 20 INJECTION, SOLUTION INTRAVENOUS at 16:35:00

## 2021-12-06 NOTE — ANESTHESIA PREPROCEDURE EVALUATION
PRE-OP EVALUATION    Patient Name: Sayra Buckner    Admit Diagnosis: Anal fissure [K60.2]    Pre-op Diagnosis: Anal fissure [K60.2]    ANAL EXAMINATION UNDER ANESTHESIA, POSSIBLE LATERAL SPHINCTEROTOMY    Anesthesia Procedure: ANAL EXAMINATION UNDER ENDOSCOPY   • HC IMPLANT EAR TUBES  1993   • OTHER SURGICAL HISTORY  11/2020    growth in mouth      Social History    Tobacco Use      Smoking status: Former Smoker      Smokeless tobacco: Never Used    Alcohol use: Not Currently      Drug use:    Types:

## 2021-12-06 NOTE — ANESTHESIA PROCEDURE NOTES
Airway  Date/Time: 12/6/2021 4:43 PM  Urgency: elective      General Information and Staff    Patient location during procedure: OR  Anesthesiologist: Cydney Saint, MD  Performed: anesthesiologist     Indications and Patient Condition  Indications for airwa

## 2021-12-06 NOTE — H&P
Patient is seen me on 2 prior occasions. Maria A Ugarte was advised to undergo colonoscopy and anal exam under anesthesia with possible lateral sphincterotomy.  Patient now presents again with new complaint of rectal bleeding states he is concerned that he may have c MG Oral Tablet 24 Hr, TAKE 1 TABLET BY MOUTH EVERY DAY, Disp: 90 tablet, Rfl: 0  •  Valsartan-Hydrochlorothiazide (DIOVAN HCT) 320-25 MG Oral Tab, Take 1 tablet by mouth daily. , Disp: 90 tablet, Rfl: 0      Review of Systems  The Review of Systems has been expectations following lateral sphincterotomy  No orders of the defined types were placed in this encounter.

## 2021-12-07 NOTE — ANESTHESIA POSTPROCEDURE EVALUATION
[facility] Patient Status:  Hospital Outpatient Surgery   Age/Gender 27year old male MRN [id number]   Location [address] [facility] Attending Rodney Grady, [address] [facility] Day # 0 PCP Cori Galeazzi, MD Cristela Simmonds

## 2021-12-09 ENCOUNTER — HOSPITAL ENCOUNTER (OUTPATIENT)
Age: 31
Discharge: HOME | End: 2021-12-09
Attending: EMERGENCY MEDICINE
Payer: COMMERCIAL

## 2021-12-09 VITALS
RESPIRATION RATE: 16 BRPM | HEART RATE: 92 BPM | DIASTOLIC BLOOD PRESSURE: 87 MMHG | OXYGEN SATURATION: 99 % | TEMPERATURE: 99 F | SYSTOLIC BLOOD PRESSURE: 152 MMHG

## 2021-12-09 DIAGNOSIS — R20.2 PARESTHESIAS: Primary | ICD-10-CM

## 2021-12-09 PROCEDURE — 99213 OFFICE O/P EST LOW 20 MIN: CPT

## 2021-12-09 PROCEDURE — 80047 BASIC METABLC PNL IONIZED CA: CPT

## 2021-12-09 PROCEDURE — 36415 COLL VENOUS BLD VENIPUNCTURE: CPT

## 2021-12-09 PROCEDURE — 85025 COMPLETE CBC W/AUTO DIFF WBC: CPT | Performed by: EMERGENCY MEDICINE

## 2021-12-09 NOTE — ED PROVIDER NOTES
Patient Seen in: [facility]      History   Patient presents with:  Numbness    Stated Complaint: NUMBNESS IN BOTH FEET    Subjective:   HPI    The patient is a 26-year-old male with a history of hypertension, anal fissure, who had rectal Systems    Positive for stated complaint: NUMBNESS IN BOTH FEET  Other systems are as noted in HPI. Constitutional and vital signs reviewed. All other systems reviewed and negative except as noted above.     Physical Exam     ED Triage Vitals [12/09/2 components:       Result Value    ISTAT BUN 6 (*)     ISTAT Glucose 101 (*)     All other components within normal limits   CBC W AUTO DIFF   POCT CBC          Labs were obtained. MDM          Patient's labs are reassuring.   He has no objective tamie

## 2021-12-09 NOTE — OPERATIVE REPORT
[facility]    PATIENT'S NAME: Hailey Ziegler   ATTENDING PHYSICIAN: Lyle Christensen D.O.   OPERATING PHYSICIAN: Lyle Christensen D.O.   PATIENT ACCOUNT#:   [de-identified]    LOCATION:  PACU [facility] PACU 3 EDWP 10  MEDICAL RECORD #:   [id number]       DATE OF Mildred Howell.  d: 12/08/2021 09:39:50  t: 12/08/2021 20:00:16  [facility] [id number]  /    cc: Rosemarie Mari D.O.

## 2021-12-09 NOTE — ED INITIAL ASSESSMENT (HPI)
C/o bottom of both feet with numbness/burning tingling sensation- feels like pins and needles for 3 days. Slight numbness to both upper thigh for 2.5-3 weeks.   Denies known injury

## 2021-12-13 ENCOUNTER — OFFICE VISIT (OUTPATIENT)
Dept: INTERNAL MEDICINE CLINIC | Facility: CLINIC | Age: 31
End: 2021-12-13
Payer: COMMERCIAL

## 2021-12-13 VITALS
RESPIRATION RATE: 16 BRPM | HEART RATE: 64 BPM | BODY MASS INDEX: 24.79 KG/M2 | TEMPERATURE: 98 F | SYSTOLIC BLOOD PRESSURE: 136 MMHG | DIASTOLIC BLOOD PRESSURE: 84 MMHG | HEIGHT: 72 IN | OXYGEN SATURATION: 98 % | WEIGHT: 183 LBS

## 2021-12-13 DIAGNOSIS — I10 ESSENTIAL HYPERTENSION, BENIGN: ICD-10-CM

## 2021-12-13 DIAGNOSIS — M54.16 LUMBAR RADICULOPATHY: Primary | ICD-10-CM

## 2021-12-13 DIAGNOSIS — Z28.21 INFLUENZA VACCINATION DECLINED BY PATIENT: ICD-10-CM

## 2021-12-13 PROCEDURE — 99214 OFFICE O/P EST MOD 30 MIN: CPT | Performed by: PHYSICIAN ASSISTANT

## 2021-12-13 PROCEDURE — 3075F SYST BP GE 130 - 139MM HG: CPT | Performed by: PHYSICIAN ASSISTANT

## 2021-12-13 PROCEDURE — 3079F DIAST BP 80-89 MM HG: CPT | Performed by: PHYSICIAN ASSISTANT

## 2021-12-13 PROCEDURE — 3008F BODY MASS INDEX DOCD: CPT | Performed by: PHYSICIAN ASSISTANT

## 2021-12-13 RX ORDER — NAPROXEN 500 MG/1
500 TABLET ORAL 2 TIMES DAILY WITH MEALS
Qty: 14 TABLET | Refills: 0 | Status: SHIPPED | OUTPATIENT
Start: 2021-12-13 | End: 2022-01-17 | Stop reason: ALTCHOICE

## 2021-12-13 RX ORDER — CYCLOBENZAPRINE HCL 10 MG
10 TABLET ORAL NIGHTLY
Qty: 7 TABLET | Refills: 0 | Status: SHIPPED | OUTPATIENT
Start: 2021-12-13

## 2021-12-13 NOTE — PATIENT INSTRUCTIONS
Take naproxen twice daily with food  Take cyclobenzaprine nightly as needed for muscle pain. Caution, will cause drowsiness.      Ok to stretch the back gently, as learned in physical therapy

## 2021-12-13 NOTE — PROGRESS NOTES
Subjective:   Patient ID: Aliya Young is a 27year old male. Patient presents with: Follow - Up: Urgent care visit 12/9 for bilateral numbness to feet and legs       Tingling  This is a recurrent problem.  The current episode started in the past 7 tablets by mouth every 4 (four) hours as needed for Pain. (Patient not taking: No sig reported) 30 tablet 0   • dibucaine 1 % External Ointment Apply 1 Application topically as needed for Pain.  (Patient not taking: No sig reported) 1 each 0   • Hydrocort-P encounter. Meds This Visit:  Requested Prescriptions     Signed Prescriptions Disp Refills   • naproxen 500 MG Oral Tab 14 tablet 0     Sig: Take 1 tablet (500 mg total) by mouth 2 (two) times daily with meals.    • cyclobenzaprine 10 MG Oral Tab 7 tab

## 2021-12-15 ENCOUNTER — OFFICE VISIT (OUTPATIENT)
Dept: SURGERY | Facility: CLINIC | Age: 31
End: 2021-12-15

## 2021-12-15 VITALS — TEMPERATURE: 97 F | HEART RATE: 113 BPM | SYSTOLIC BLOOD PRESSURE: 149 MMHG | DIASTOLIC BLOOD PRESSURE: 94 MMHG

## 2021-12-15 DIAGNOSIS — K62.5 RECTAL BLEEDING: Primary | ICD-10-CM

## 2021-12-15 PROCEDURE — 99024 POSTOP FOLLOW-UP VISIT: CPT | Performed by: SURGERY

## 2021-12-15 PROCEDURE — 3080F DIAST BP >= 90 MM HG: CPT | Performed by: SURGERY

## 2021-12-15 PROCEDURE — 3077F SYST BP >= 140 MM HG: CPT | Performed by: SURGERY

## 2021-12-15 NOTE — PROGRESS NOTES
[facility]  Progress Note    Luke Lighter Patient Status:  No patient class for patient encounter    1990 MRN [id number]   Location [facility] [address] [facility] Attending No att. providers found   Hosp Day # 0 PCP Eddie diagnosis, prognosis, and general treatment was explained to the patient and the family.         DO RYLAN Marley General Surgery  12/15/2021  2:53 PM

## 2021-12-15 NOTE — PROGRESS NOTES
Notified patient benign results per Dr. Jakub Zamora.     Future Appointments  12/22/2021 1:45 PM    Alexander Dias DO         [id number]           [facility]

## 2022-01-05 ENCOUNTER — OFFICE VISIT (OUTPATIENT)
Dept: SURGERY | Facility: CLINIC | Age: 32
End: 2022-01-05

## 2022-01-05 VITALS — TEMPERATURE: 97 F

## 2022-01-05 DIAGNOSIS — K60.2 ANAL FISSURE: Primary | ICD-10-CM

## 2022-01-05 PROCEDURE — 99024 POSTOP FOLLOW-UP VISIT: CPT | Performed by: SURGERY

## 2022-01-05 NOTE — PROGRESS NOTES
Patient seen and examined he denies complaints states he is taking no pain medications. States the perianal pain he was experiencing prior to surgery was resolved almost instantly following the surgery. He claims minor drainage from the area.   Physical e

## 2022-01-17 ENCOUNTER — OFFICE VISIT (OUTPATIENT)
Dept: INTERNAL MEDICINE CLINIC | Facility: CLINIC | Age: 32
End: 2022-01-17
Payer: COMMERCIAL

## 2022-01-17 VITALS
BODY MASS INDEX: 25.47 KG/M2 | RESPIRATION RATE: 12 BRPM | SYSTOLIC BLOOD PRESSURE: 150 MMHG | DIASTOLIC BLOOD PRESSURE: 90 MMHG | HEART RATE: 94 BPM | TEMPERATURE: 99 F | WEIGHT: 188 LBS | OXYGEN SATURATION: 97 % | HEIGHT: 72 IN

## 2022-01-17 DIAGNOSIS — M54.16 LUMBAR RADICULOPATHY: ICD-10-CM

## 2022-01-17 DIAGNOSIS — M54.50 CHRONIC MIDLINE LOW BACK PAIN WITHOUT SCIATICA: Primary | ICD-10-CM

## 2022-01-17 DIAGNOSIS — I10 ESSENTIAL HYPERTENSION, BENIGN: ICD-10-CM

## 2022-01-17 DIAGNOSIS — G89.29 CHRONIC MIDLINE LOW BACK PAIN WITHOUT SCIATICA: Primary | ICD-10-CM

## 2022-01-17 PROCEDURE — 3008F BODY MASS INDEX DOCD: CPT | Performed by: PHYSICIAN ASSISTANT

## 2022-01-17 PROCEDURE — 3077F SYST BP >= 140 MM HG: CPT | Performed by: PHYSICIAN ASSISTANT

## 2022-01-17 PROCEDURE — 3080F DIAST BP >= 90 MM HG: CPT | Performed by: PHYSICIAN ASSISTANT

## 2022-01-17 PROCEDURE — 99214 OFFICE O/P EST MOD 30 MIN: CPT | Performed by: PHYSICIAN ASSISTANT

## 2022-01-17 RX ORDER — GABAPENTIN 300 MG/1
300 CAPSULE ORAL NIGHTLY
Qty: 14 CAPSULE | Refills: 0 | Status: SHIPPED | OUTPATIENT
Start: 2022-01-17

## 2022-01-17 NOTE — PATIENT INSTRUCTIONS
Take gabapentin at bedtime for pain  Continue valsartan for blood pressure  Schedule lumbar spine x-ray; we will contact you about results and ordering your MRI

## 2022-01-17 NOTE — PROGRESS NOTES
Subjective:   Patient ID: Francine Dow is a 27year old male. Low Back Pain  This is a chronic problem. The current episode started more than 1 year ago. The problem occurs constantly. The problem has been gradually worsening since onset.  The [place] sounds. No murmur heard. Pulmonary:      Effort: Pulmonary effort is normal.      Breath sounds: Normal breath sounds. Musculoskeletal:      Lumbar back: Bony tenderness present. No swelling or spasms. Normal range of motion.  Negative right straight

## 2022-08-04 ENCOUNTER — OFFICE VISIT (OUTPATIENT)
Dept: INTERNAL MEDICINE CLINIC | Facility: CLINIC | Age: 32
End: 2022-08-04

## 2022-08-04 VITALS
BODY MASS INDEX: 26.95 KG/M2 | RESPIRATION RATE: 16 BRPM | HEART RATE: 66 BPM | WEIGHT: 199 LBS | OXYGEN SATURATION: 98 % | HEIGHT: 72 IN | DIASTOLIC BLOOD PRESSURE: 88 MMHG | SYSTOLIC BLOOD PRESSURE: 128 MMHG | TEMPERATURE: 98 F

## 2022-08-04 DIAGNOSIS — M79.644 PAIN OF RIGHT THUMB: ICD-10-CM

## 2022-08-04 DIAGNOSIS — M25.531 RIGHT WRIST PAIN: Primary | ICD-10-CM

## 2022-08-04 RX ORDER — NAPROXEN 500 MG/1
500 TABLET ORAL 2 TIMES DAILY WITH MEALS
Qty: 28 TABLET | Refills: 0 | Status: SHIPPED | OUTPATIENT
Start: 2022-08-04 | End: 2022-08-18

## 2022-08-18 ENCOUNTER — HOSPITAL ENCOUNTER (EMERGENCY)
Facility: HOSPITAL | Age: 32
Discharge: HOME | End: 2022-08-18
Attending: EMERGENCY MEDICINE

## 2022-08-18 ENCOUNTER — APPOINTMENT (OUTPATIENT)
Dept: GENERAL RADIOLOGY | Facility: HOSPITAL | Age: 32
End: 2022-08-18
Attending: EMERGENCY MEDICINE

## 2022-08-18 VITALS
SYSTOLIC BLOOD PRESSURE: 185 MMHG | HEIGHT: 72 IN | OXYGEN SATURATION: 100 % | DIASTOLIC BLOOD PRESSURE: 122 MMHG | HEART RATE: 89 BPM | TEMPERATURE: 98 F | BODY MASS INDEX: 26.82 KG/M2 | RESPIRATION RATE: 15 BRPM | WEIGHT: 198 LBS

## 2022-08-18 DIAGNOSIS — M25.531 RIGHT WRIST PAIN: Primary | ICD-10-CM

## 2022-08-18 PROCEDURE — 99283 EMERGENCY DEPT VISIT LOW MDM: CPT

## 2022-08-18 PROCEDURE — 73110 X-RAY EXAM OF WRIST: CPT | Performed by: EMERGENCY MEDICINE

## 2022-08-18 NOTE — ED INITIAL ASSESSMENT (HPI)
Pt here with c/o R wrist pain x 1 month, states was supposed to have xray outpatient but just lost his insurance.

## 2024-11-13 ENCOUNTER — OFFICE VISIT (OUTPATIENT)
Dept: INTERNAL MEDICINE CLINIC | Facility: CLINIC | Age: 34
End: 2024-11-13
Payer: COMMERCIAL

## 2024-11-13 VITALS
HEIGHT: 72 IN | TEMPERATURE: 98 F | BODY MASS INDEX: 28.71 KG/M2 | HEART RATE: 70 BPM | WEIGHT: 212 LBS | SYSTOLIC BLOOD PRESSURE: 150 MMHG | RESPIRATION RATE: 16 BRPM | OXYGEN SATURATION: 98 % | DIASTOLIC BLOOD PRESSURE: 100 MMHG

## 2024-11-13 DIAGNOSIS — S99.911D INJURY OF RIGHT ANKLE, SUBSEQUENT ENCOUNTER: ICD-10-CM

## 2024-11-13 DIAGNOSIS — Z00.00 ROUTINE PHYSICAL EXAMINATION: Primary | ICD-10-CM

## 2024-11-13 DIAGNOSIS — Z72.0 TOBACCO USE: ICD-10-CM

## 2024-11-13 DIAGNOSIS — Z00.00 LABORATORY EXAM ORDERED AS PART OF ROUTINE GENERAL MEDICAL EXAMINATION: ICD-10-CM

## 2024-11-13 DIAGNOSIS — Z11.3 ROUTINE SCREENING FOR STI (SEXUALLY TRANSMITTED INFECTION): ICD-10-CM

## 2024-11-13 DIAGNOSIS — I10 ESSENTIAL HYPERTENSION, BENIGN: ICD-10-CM

## 2024-11-13 DIAGNOSIS — T36.95XA ADVERSE REACTION TO ANTIBIOTIC: ICD-10-CM

## 2024-11-13 RX ORDER — NAPROXEN 500 MG/1
500 TABLET ORAL 2 TIMES DAILY WITH MEALS
Qty: 28 TABLET | Refills: 0 | Status: SHIPPED | OUTPATIENT
Start: 2024-11-13

## 2024-11-13 NOTE — PROGRESS NOTES
Wellness Exam    CC: Patient is presenting for a wellness exam    HPI:   Current Complaints: patient here to reestablish care  c/o right ankle swelling, pain, stiffness.   Originally injured at work 9/23 when a cart ran into the back of his leg. Was having ankle pain. Was on light duty.  Then had dysuria, went to a walk in clinic and given cipro for e.coli on urine culture.   He took cipro for 2 days then felt worsened ankle pain, so he stopped the cipro. States this was around 11/1.  Meanwhile his urine test came back positive for gonorrhea. Pt was given ceftriaxone injection at the walk in clinic for treatment.  Pt states his right ankle is worse than ever. Denies any further trauma. Feels unsteady while walking, unable to tolerate work at [employer] due to the pain and swelling. No tx tried.    Pt reports good compliance on valsartan. He does not monitor his blood pressure    Colon cancer screening:  No recommendations at this time   Prostate cancer screening:  There are no preventive care reminders to display for this patient.     Pertinent Family History:   Family History   Problem Relation Age of Onset    Hypertension Paternal Grandfather     Hypertension Paternal Grandmother     Other (COPD) Paternal Grandmother     Other (downs syndrome) Sister       Past Medical History:    COVID-19    Loss of taste/smell, minor chest pain, no lingering symptoms, no hospitalization    Esophageal reflux    Essential hypertension    High blood pressure    Hypertension    Right arm fracture     Past Surgical History:   Procedure Laterality Date    Colonoscopy N/A 11/29/2021    Procedure: COLONOSCOPY;  Surgeon: David Saldaña DO;  Location:  ENDOSCOPY    Hc implant ear tubes  1993    Other surgical history  11/2020    growth in mouth      Social History     Socioeconomic History    Marital status: Single   Occupational History    Occupation: Student   Tobacco Use    Smoking status: Former    Smokeless tobacco: Never   Vaping Use     Vaping status: Former    Substances: THC   Substance and Sexual Activity    Alcohol use: Not Currently    Drug use: Yes     Types: Cannabis     Comment: marijuana   Other Topics Concern    Caffeine Concern Yes     Comment: 2 to 3 per week     Exercise No     Social Drivers of Health     Financial Resource Strain: Not on File (2023)    Received from [facility]    Financial Resource Strain     Financial Resource Strain: 0   Food Insecurity: Not on File (2024)    Received from [facility]    Food Insecurity     Food: 0   Transportation Needs: Not on File (2023)    Received from [facility]    Transportation Needs     Transportation: 0   Physical Activity: Not on File (2023)    Received from [facility]    Physical Activity     Physical Activity: 0   Stress: Not on File (2023)    Received from [facility]    Stress     Stress: 0   Social Connections: Not on File (2024)    Received from [facility]    Social Connections     Connectedness: 0   Housing Stability: Not on File (2023)    Received from [facility]    Housing Stability     Housin     Medications Ordered Prior to Encounter[1]  Tobacco:  He smoked tobacco in the past but quit greater than 12 months ago.  Social History     Tobacco Use   Smoking Status Former   Smokeless Tobacco Never           Review of Systems   Constitutional: Negative for fever, chills and fatigue.   HENT: Negative for hearing loss, congestion, sore throat and neck pain.    Eyes: Negative for pain and visual disturbance.   Respiratory: Negative for cough and shortness of breath.    Cardiovascular: Negative for chest pain and palpitations.   Gastrointestinal: Negative for nausea, vomiting, abdominal pain and diarrhea.   Genitourinary: Negative for urgency, frequency and difficulty urinating.   Musculoskeletal: Negative for arthralgias and gait problem.   Skin: Negative for color change and rash.   Neurological: Negative for tremors, weakness and numbness.    Hematological: Negative for adenopathy. Does not bruise/bleed easily.   Psychiatric/Behavioral: Negative for confusion and agitation. The patient is not nervous/anxious.      BP (!) 150/100   Pulse 70   Temp 97.8 °F (36.6 °C)   Resp 16   Ht 6' (1.829 m)   Wt 212 lb (96.2 kg)   SpO2 98%   BMI 28.75 kg/m²   Physical Exam   Constitutional: He is oriented to person, place, and time. He appears well-developed. No distress.   HENT: Normocephalic and atraumatic. Nose normal. TMs pearly gray, + light reflex.  Mucous membranes moist, dentition normal.  Oropharynx without erythema, exudate or tonsillar hypertrophy  Eyes: EOM are normal. Pupils are equal, round, and reactive to light. No scleral icterus.   Neck: Normal range of motion. No thyromegaly present.   Cardiovascular: Normal rate, regular rhythm and normal heart sounds.  Exam reveals no friction rub, no murmur heard.  Pulmonary/Chest: Effort normal and breath sounds normal b/l. He has no wheezes or rales.   Abdominal: Soft. Bowel sounds are normal. There is no tenderness. No HSM.  Abdominal aorta normal in size, no hernia appreciated.  Musculoskeletal:   Right ankle + medial and lateral soft tissue swelling. No bony deformity   +tenderness medial malleolus and surrounding soft tissue  Achilles tendon is intact. No swelling of the achilles tendon noted; it is not tender.  + pain with flexion and inversion of the ankle.     Lymphadenopathy: He has no cervical or supraclavicular adenopathy.   Neurological: He is alert and oriented to person, place, and time.  DTRs +2 and symmetric b/l.   Skin: Skin is warm. No rash noted. No erythema, pallor or jaundice.   Psychiatric: He has a normal mood and affect. His behavior is normal.       Assessment and Plan:  Zeny Briones is a 34 year old male here for a wellness exam  Age appropriate cancer screening, labs, safety, immunizations were discussed with the patient and ordered as follows:  1. Routine physical  examination (Primary)  2. Laboratory exam ordered as part of routine general medical examination  -     CBC With Differential With Platelet  -     Comp Metabolic Panel (14)  -     Lipid Panel  -     TSH W Reflex To Free T4  -     Urinalysis, Routine  3. Routine screening for STI (sexually transmitted infection)  Treated for gonorrhea 2 wks ago, ceftriaxone IM x 1. Recheck gonorrhea, chlamydia screening r/o reinfection in 3 mos  -     Chlamydia/Gc Amplification; Future; Expected date: 01/01/2025  4. Injury of right ankle, subsequent encounter  Check XR today  RICE, ACE wrap, see podiatry  -     XR ANKLE (MIN 3 VIEWS), RIGHT (CPT=73610); Future; Expected date: 11/13/2024  -     Podiatry Referral  -     Naproxen; Take 1 tablet (500 mg total) by mouth 2 (two) times daily with meals.  Dispense: 28 tablet; Refill: 0  5. Adverse reaction to antibiotic  Worsened ankle injury after fluoroquinolone exposure. Pt off of antibiotic now. Establish with podiatry r/o tendon rupture. Patient reassured today that his achilles is intact on exam. Avoid exacerbating activities.  -     Podiatry Referral   6. Essential hypertension  Poorly controled. Monitor at home with cuff. Continue valsartan, start naproxen as above for pain control, f/up 2 wks recheck BP    Return in about 2 weeks (around 11/27/2024).   Patient/Caregiver Education:  Patient/Caregiver Education: There are no barriers to learning. Medical education done.  Outcome: Patient verbalizes understanding.      Educated by: RENARD         [1]   Current Outpatient Medications on File Prior to Visit   Medication Sig Dispense Refill    valsartan 320 MG Oral Tab Take 1 tablet (320 mg total) by mouth daily.       No current facility-administered medications on file prior to visit.

## 2024-11-13 NOTE — PATIENT INSTRUCTIONS
Complete x-ray today  Rest, elevate the ankle, use ACE wrap for compression if desired  Schedule with podiatrist  Take naproxen up to twice daily with food for 14 days. Caution, may cause stomach upset or heartburn.  Monitor blood pressure at home. Contact us if BP remains elevated (over 140/90) and we will adjust your medication.

## 2024-11-14 ENCOUNTER — HOSPITAL ENCOUNTER (OUTPATIENT)
Dept: GENERAL RADIOLOGY | Age: 34
Discharge: HOME | End: 2024-11-14
Attending: PHYSICIAN ASSISTANT
Payer: COMMERCIAL

## 2024-11-14 ENCOUNTER — MED REC SCAN ONLY (OUTPATIENT)
Dept: INTERNAL MEDICINE CLINIC | Facility: CLINIC | Age: 34
End: 2024-11-14

## 2024-11-14 ENCOUNTER — TELEPHONE (OUTPATIENT)
Dept: INTERNAL MEDICINE CLINIC | Facility: CLINIC | Age: 34
End: 2024-11-14

## 2024-11-14 DIAGNOSIS — S99.911D INJURY OF RIGHT ANKLE, SUBSEQUENT ENCOUNTER: ICD-10-CM

## 2024-11-14 PROCEDURE — 73610 X-RAY EXAM OF ANKLE: CPT | Performed by: PHYSICIAN ASSISTANT

## 2024-11-19 NOTE — TELEPHONE ENCOUNTER
Received Family Medical Leave Act and disability form in the forms department. Per RN on 11/14 forms were completed by office. Archived.

## 2024-12-10 NOTE — TELEPHONE ENCOUNTER
Attempted to contact [employer], was directed to alternative representative informed above representative was with other client. Spoke to candido Hoover. Requesting completed claim form and 11/13/24 office notes to be faxed. Both faxed and placed to scan bin.

## 2024-12-10 NOTE — TELEPHONE ENCOUNTER
Patient states Quinton from [employer] company is trying to reach us and we are not calling back.  He is seeking additional information in order to complete claim and get patient paid. He is requesting a call today on his direct line so he can finish the claim, [employer] direct  line, [phone], ext [phone].

## 2024-12-14 NOTE — PROGRESS NOTES
Zeny Briones is a 34 year old male.  HPI:   HPI   Pt presents today for right ankle pain after sustained an injury at work on 9/23 when a cart ran into the back of his leg. Pt was also prescribed Cipro for dysuria and the pain worsen after two day use; pt stopped the medication. Meanwhile the urine test cam back positive for gonorrhea and was treated with ceftriaxone injection at the walk in clinic.  Pt had FMLA paperwork filled out, now needs short term disability forms filled out. Pt has not been able to work since.   Ankle still causing pain when going down the stairs. Swelling resolved. Pt had Xray completed which showed:  Mild soft tissue swelling surrounds the ankle best seen over the lateral malleolus.  The ankle mortise is intact.  A discrete fracture is not clearly identified.   Will be seeing a podiatrist this week.    HTN- ran out of the medication two days ago. Will be going to  the medication after this visit. Denies headaches, vision changes.  Pt states when he monitors BP at home systolic in range of 120-140, diastolic in range of 80's.  Current Outpatient Medications   Medication Sig Dispense Refill    HYDROcodone-acetaminophen 5-325 MG Oral Tab TAKE 1 TABLET BY MOUTH EVERY 6 HOURS AS NEEDED FOR SEVERE PAIN FOR UP TO 3 DAYS      cyclobenzaprine 10 MG Oral Tab Take 1 tablet (10 mg total) by mouth 3 (three) times daily as needed for Muscle spasms.      naproxen 500 MG Oral Tab Take 1 tablet (500 mg total) by mouth 2 (two) times daily with meals. 28 tablet 0    valsartan 320 MG Oral Tab Take 1 tablet (320 mg total) by mouth daily.        Past Medical History:    COVID-19    Loss of taste/smell, minor chest pain, no lingering symptoms, no hospitalization    Esophageal reflux    Essential hypertension    High blood pressure    Hypertension    Right arm fracture      Social History:  Social History     Socioeconomic History    Marital status: Single   Occupational History    Occupation:  Student   Tobacco Use    Smoking status: Former    Smokeless tobacco: Never   Vaping Use    Vaping status: Former    Substances: THC   Substance and Sexual Activity    Alcohol use: Not Currently    Drug use: Yes     Types: Cannabis     Comment: marijuana   Other Topics Concern    Caffeine Concern Yes     Comment: 2 to 3 per week     Exercise No     Social Drivers of Health     Financial Resource Strain: Not on File (2023)    Received from [facility]    Financial Resource Strain     Financial Resource Strain: 0   Food Insecurity: Not on File (2024)    Received from [facility]    Food Insecurity     Food: 0   Transportation Needs: Not on File (2023)    Received from [facility]    Transportation Needs     Transportation: 0   Physical Activity: Not on File (2023)    Received from [facility]    Physical Activity     Physical Activity: 0   Stress: Not on File (2023)    Received from [facility]    Stress     Stress: 0   Social Connections: Not on File (2024)    Received from [facility]    Social Connections     Connectedness: 0   Housing Stability: Not on File (2023)    Received from [facility]    Housing Stability     Housin        REVIEW OF SYSTEMS:   GENERAL HEALTH: feels well otherwise. Denies fever, chills, unintentional weight change  SKIN: denies any unusual skin lesions or rashes  RESPIRATORY: denies shortness of breath with exertion, denies cough or wheezing  CARDIOVASCULAR: denies chest pain or palpitations, denies leg swelling  GI: denies abdominal pain and denies heartburn. Denies nausea, vomiting, diarrhea, constipation  NEURO: denies headaches, dizziness, weakness, syncope  PSYCH: denies anxiety, depression, insomnia    EXAM:   BP (!) 170/110   Pulse 79   Temp 97 °F (36.1 °C) (Temporal)   Resp 18   Ht 6' (1.829 m)   Wt 214 lb (97.1 kg)   SpO2 100%   BMI 29.02 kg/m²   GENERAL: well developed, well nourished,in no apparent distress  SKIN: no rashes,no suspicious  lesions, warm and dry  HEENT: atraumatic, normocephalic,ears and throat are clear  NECK: supple,no adenopathy, no thyromegaly  LUNGS: clear to auscultation b/l no W/R/R  CARDIO: RRR without murmur  GI: good BS's,no masses, HSM, distension or tenderness  EXTREMITIES: no cyanosis, clubbing or edema  MUSCULOSKELETAL: FROM, no joint swelling or bony tenderness. + left ankle medial tenderness.   NEURO: a/ox3, no focal deficits  PSYCH: mood and affect normal    ASSESSMENT AND PLAN:     Encounter Diagnoses   Name Primary?    Injury of right ankle, subsequent encounter  -work forms will be sent to the form department  -f/u with podiatry as scheduled Yes    Essential hypertension, benign  -uncontrolled now due to patient has not taken his medication for two days. Pt will be going to  the medication after this visit. Advised to monitor BP at home and f/u in 2wks.      Requested Prescriptions      No prescriptions requested or ordered in this encounter         The patient indicates understanding of these issues and agrees to the plan.  The patient is asked to return in 2 wks.

## 2024-12-16 ENCOUNTER — OFFICE VISIT (OUTPATIENT)
Dept: INTERNAL MEDICINE CLINIC | Facility: CLINIC | Age: 34
End: 2024-12-16
Payer: COMMERCIAL

## 2024-12-16 ENCOUNTER — TELEPHONE (OUTPATIENT)
Dept: INTERNAL MEDICINE CLINIC | Facility: CLINIC | Age: 34
End: 2024-12-16

## 2024-12-16 VITALS
BODY MASS INDEX: 28.99 KG/M2 | HEART RATE: 79 BPM | HEIGHT: 72 IN | WEIGHT: 214 LBS | SYSTOLIC BLOOD PRESSURE: 170 MMHG | RESPIRATION RATE: 18 BRPM | TEMPERATURE: 97 F | OXYGEN SATURATION: 100 % | DIASTOLIC BLOOD PRESSURE: 110 MMHG

## 2024-12-16 DIAGNOSIS — S99.911D INJURY OF RIGHT ANKLE, SUBSEQUENT ENCOUNTER: Primary | ICD-10-CM

## 2024-12-16 DIAGNOSIS — I10 ESSENTIAL HYPERTENSION, BENIGN: ICD-10-CM

## 2024-12-16 RX ORDER — HYDROCODONE BITARTRATE AND ACETAMINOPHEN 5; 325 MG/1; MG/1
TABLET ORAL
COMMUNITY
Start: 2024-11-23

## 2024-12-16 RX ORDER — CYCLOBENZAPRINE HCL 10 MG
10 TABLET ORAL 3 TIMES DAILY PRN
COMMUNITY
Start: 2024-11-23

## 2024-12-16 NOTE — TELEPHONE ENCOUNTER
Papers received for short term disability pt paid $25.00 processing fee by [employer]    Forms emailed and placed in-house mail,

## 2024-12-16 NOTE — TELEPHONE ENCOUNTER
Pt has appt today with MADDIE Keyes for short term disability paperwork completion. Closing encounter.

## 2024-12-17 ENCOUNTER — MED REC SCAN ONLY (OUTPATIENT)
Dept: INTERNAL MEDICINE CLINIC | Facility: CLINIC | Age: 34
End: 2024-12-17

## 2024-12-30 NOTE — TELEPHONE ENCOUNTER
Stehpy,    Please sign off on form if you agree to: disability 10/31/24- pending re-eval on 1/6/25    -Signature page will be the first page scanned  -From your Inbasket, Highlight the patient and click Chart   -Double click the 12/16/24 Forms Completion telephone encounter  -Scroll down to the Media section   -Click the blue Hyperlink: Adele Abel 12/30/24  -Click Acknowledge located in the top right ribbon/menu   -Drag the mouse into the blank space of the document and a + sign will appear. Left click to   electronically sign the document.  -Once signed, simply exit out of the screen and you signature will be saved.     Thank you,    Ashanti dorsey

## 2024-12-31 NOTE — TELEPHONE ENCOUNTER
Dr Larson called with help on locating form to sign. Forms department Lead Glenna TORRES will re route to dr Larson for signature.

## 2024-12-31 NOTE — TELEPHONE ENCOUNTER
Dr Larson,  Please sign off on form if you agree to: disability 10/31/24- pending re-eval on 1/6/25     -Signature page will be the first page scanned  -From your Inbasket, Highlight the patient and click Chart   -Double click the 12/16/24 Forms Completion telephone encounter  -Scroll down to the Media section   -Click the blue Hyperlink: Adele Abel 12/30/24  -Click Acknowledge located in the top right ribbon/menu   -Drag the mouse into the blank space of the document and a + sign will appear. Left click to   electronically sign the document.  -Once signed, simply exit out of the screen and you signature will be saved.     Thank you,

## 2025-01-03 ENCOUNTER — OFFICE VISIT (OUTPATIENT)
Facility: LOCATION | Age: 35
End: 2025-01-03
Payer: COMMERCIAL

## 2025-01-03 ENCOUNTER — HOSPITAL ENCOUNTER (OUTPATIENT)
Dept: GENERAL RADIOLOGY | Age: 35
Discharge: HOME | End: 2025-01-03
Attending: PODIATRIST
Payer: COMMERCIAL

## 2025-01-03 DIAGNOSIS — M25.571 RIGHT ANKLE PAIN, UNSPECIFIED CHRONICITY: ICD-10-CM

## 2025-01-03 DIAGNOSIS — M79.671 BILATERAL FOOT PAIN: ICD-10-CM

## 2025-01-03 DIAGNOSIS — M25.375 FOOT JOINT INSTABILITY, LEFT: Primary | ICD-10-CM

## 2025-01-03 DIAGNOSIS — M21.42 PES PLANUS OF BOTH FEET: ICD-10-CM

## 2025-01-03 DIAGNOSIS — M21.41 PES PLANUS OF BOTH FEET: ICD-10-CM

## 2025-01-03 DIAGNOSIS — M79.672 BILATERAL FOOT PAIN: ICD-10-CM

## 2025-01-03 DIAGNOSIS — M25.374 FOOT JOINT INSTABILITY, RIGHT: ICD-10-CM

## 2025-01-03 DIAGNOSIS — M21.079 EVERSION DEFORMITY OF FOOT, UNSPECIFIED LATERALITY: ICD-10-CM

## 2025-01-03 NOTE — PROGRESS NOTES
[facility]  Progress Note      Zeny Briones is a 34 year old male.   Chief Complaint   Patient presents with    New Patient     Patient is here to discuss neuropathy in bilateral feet, left foot is worse than the right. He rates pain 8/10, denies any swelling. States that the pain is on the bottom of the foot and 3rd digit.         HPI:     Patient is a pleasant 34-year-old male who is presenting to clinic today to discuss concerns of possible neuropathy to both of his feet.  Patient states that he has ongoing foot pain (left worse than right), and states that the pain can be achy, burning, tingling, and states that it worsens the longer he is on his feet.  It is mostly in his left forefoot currently.  Denies any injuries.  Patient states that he does have flatfeet.  He is utilizing crocs today.  Does state that he noticed some increasing discomfort to his lesser toes of his left foot recently, but attributes this to poor-fitting steel toe boots with work.  Rates his pain 8/10.  Denying any swelling to his feet.  Hoping discuss options.  Patient also had a right ankle injury, who is seeing an outside podiatrist for.  No other concerns.      Allergies: Patient has no known allergies.   Current Outpatient Medications   Medication Sig Dispense Refill    HYDROcodone-acetaminophen 5-325 MG Oral Tab TAKE 1 TABLET BY MOUTH EVERY 6 HOURS AS NEEDED FOR SEVERE PAIN FOR UP TO 3 DAYS      cyclobenzaprine 10 MG Oral Tab Take 1 tablet (10 mg total) by mouth 3 (three) times daily as needed for Muscle spasms.      naproxen 500 MG Oral Tab Take 1 tablet (500 mg total) by mouth 2 (two) times daily with meals. 28 tablet 0    valsartan 320 MG Oral Tab Take 1 tablet (320 mg total) by mouth daily.        Past Medical History:    COVID-19    Loss of taste/smell, minor chest pain, no lingering symptoms, no hospitalization    Esophageal reflux    Essential hypertension    High blood pressure    Hypertension    Right arm  fracture      Past Surgical History:   Procedure Laterality Date    Colonoscopy N/A 11/29/2021    Procedure: COLONOSCOPY;  Surgeon: David Saldaña DO;  Location:  ENDOSCOPY    Hc implant ear tubes  1993    Other surgical history  11/2020    growth in mouth       Family History   Problem Relation Age of Onset    Hypertension Paternal Grandfather     Hypertension Paternal Grandmother     Other (COPD) Paternal Grandmother     Other (downs syndrome) Sister       Social History     Socioeconomic History    Marital status: Single   Occupational History    Occupation: Student   Tobacco Use    Smoking status: Former    Smokeless tobacco: Never   Vaping Use    Vaping status: Former    Substances: THC   Substance and Sexual Activity    Alcohol use: Not Currently    Drug use: Yes     Types: Cannabis     Comment: marijuana   Other Topics Concern    Caffeine Concern Yes     Comment: 2 to 3 per week     Exercise No           REVIEW OF SYSTEMS:     10 point ROS completed and was negative unless stated in HPI.      EXAM:     GENERAL: well developed, well nourished, in no apparent distress  EXTREMITIES:  1. Integument: Skin appears moist, warm, and supple with positive hair growth. There are no color changes. No open lesions. No macerations. No Hyperkeratotic lesions.   2. Vascular: Dorsalis pedis 2/4 bilateral and posterior tibial pulses 2/4 bilateral, capillary refill normal.  3. Neurological: Gross sensation intact via light touch bilaterally.  Normal sharp/dull sensation.  Negative Tinel's sign to tibial nerve, left.  4. Musculoskeletal: All muscle groups are graded 5/5 in the foot and ankle with no pain elicited in any direction.  Patient does have midtarsal joint instability noted bilaterally.  Collapsing medial longitudinal arches noted bilaterally.  Upon standing, collapsing medial longitudinal arch is noted with rear foot valgus, bilaterally.  Patient is able to perform double heel rise with inversion of heels and no  pain elicited.  Patient defers single heel raise.  Patient has no pain with palpation on exam of bilateral feet today.       ASSESSMENT AND PLAN:   Diagnoses and all orders for this visit:    Foot joint instability, left    Foot joint instability, right    Pes planus of both feet    Eversion deformity of foot, unspecified laterality    Bilateral foot pain    Other orders  -     Cancel: XR ANKLE WEIGHTBEARING (3 VIEWS), RIGHT (CPT=73610); Future        Plan:   -Patient was seen and evaluated today in clinic.  Chart history reviewed.    -All clinical exam findings were discussed with the patient in detail.  No pain elicited on exam today, the patient does have instability within both of his feet.    -Discussed the etiology of patient's pain today and treatment options for this    -Discussed the importance of added support to the feet and recommendations were given in regards to supportive shoe gear and over-the-counter inserts.  Can look into custom orthotics in the future    -Also discussed padding and insert modifications as needed.    -It is also recommended that patient rest, ice, and elevate in order to control inflammation    -Patient was provided with an at home strengthening and conditioning program.  Recommend performing exercises daily    -Will consider baseline radiographs and discussion of physical therapy and other options at follow-up if patient does not improve    -All of the patient's questions and concerns were addressed.  They indicated their understanding of these issues and agrees to the plan.    Time spent reviewing pertinent information from patient's chart, reviewing any pertinent imaging, obtaining history and physical exam, discussing and mutually agreeing on a treatment plan, and documenting encounter: 35 minutes    RTC 6-8 weeks    Jean Carlos Augustin DPM        1/3/2025    [facility] [address] [address]   [email or web address]            Heather  speech recognition software was used to prepare this note.  Errors in word recognition may occur.  Please contact me with any questions/concerns with this note.

## 2025-01-06 ENCOUNTER — OFFICE VISIT (OUTPATIENT)
Dept: INTERNAL MEDICINE CLINIC | Facility: CLINIC | Age: 35
End: 2025-01-06
Payer: COMMERCIAL

## 2025-01-06 VITALS
TEMPERATURE: 98 F | HEIGHT: 72 IN | SYSTOLIC BLOOD PRESSURE: 128 MMHG | BODY MASS INDEX: 29.66 KG/M2 | RESPIRATION RATE: 16 BRPM | WEIGHT: 219 LBS | HEART RATE: 78 BPM | DIASTOLIC BLOOD PRESSURE: 70 MMHG | OXYGEN SATURATION: 98 %

## 2025-01-06 DIAGNOSIS — I10 ESSENTIAL HYPERTENSION, BENIGN: Primary | ICD-10-CM

## 2025-01-06 PROCEDURE — 99213 OFFICE O/P EST LOW 20 MIN: CPT

## 2025-01-06 RX ORDER — VALSARTAN 320 MG/1
320 TABLET ORAL DAILY
Qty: 30 TABLET | Refills: 3 | Status: SHIPPED | OUTPATIENT
Start: 2025-01-06 | End: 2025-05-06

## 2025-01-06 NOTE — PROGRESS NOTES
Zeny Briones is a 34 year old male.  HPI:   HPI   Pt presents today for HTN f/u. Adherent to medication. Does not monitor BP at home.   Is currently following with podiatrist through [facility] for foot work injury.  Current Outpatient Medications   Medication Sig Dispense Refill    valsartan 320 MG Oral Tab Take 1 tablet (320 mg total) by mouth daily. 30 tablet 3    HYDROcodone-acetaminophen 5-325 MG Oral Tab TAKE 1 TABLET BY MOUTH EVERY 6 HOURS AS NEEDED FOR SEVERE PAIN FOR UP TO 3 DAYS      cyclobenzaprine 10 MG Oral Tab Take 1 tablet (10 mg total) by mouth 3 (three) times daily as needed for Muscle spasms.      naproxen 500 MG Oral Tab Take 1 tablet (500 mg total) by mouth 2 (two) times daily with meals. 28 tablet 0      Past Medical History:    COVID-19    Loss of taste/smell, minor chest pain, no lingering symptoms, no hospitalization    Esophageal reflux    Essential hypertension    High blood pressure    Hypertension    Right arm fracture      Social History:  Social History     Socioeconomic History    Marital status: Single   Occupational History    Occupation: Student   Tobacco Use    Smoking status: Former    Smokeless tobacco: Never   Vaping Use    Vaping status: Former    Substances: THC   Substance and Sexual Activity    Alcohol use: Not Currently    Drug use: Yes     Types: Cannabis     Comment: marijuana   Other Topics Concern    Caffeine Concern Yes     Comment: 2 to 3 per week     Exercise No     Social Drivers of Health     Financial Resource Strain: Not on File (9/26/2023)    [facility] from [facility]    Financial Resource Strain     Financial Resource Strain: 0   Food Insecurity: Not on File (9/26/2024)    Received from [facility]    Food Insecurity     Food: 0   Transportation Needs: Not on File (9/26/2023)    Received from [facility]    Transportation Needs     Transportation: 0   Physical Activity: Not on File (9/26/2023)    Received from [facility]    Physical Activity     Physical  Activity: 0   Stress: Not on File (2023)    Received from [facility]    Stress     Stress: 0   Social Connections: Not on File (2024)    Received from [facility]    Social Connections     Connectedness: 0   Housing Stability: Not on File (2023)    Received from [facility]    Housing Stability     Housin        REVIEW OF SYSTEMS:   GENERAL HEALTH: feels well otherwise. Denies fever, chills, unintentional weight change  SKIN: denies any unusual skin lesions or rashes  RESPIRATORY: denies shortness of breath with exertion, denies cough or wheezing  CARDIOVASCULAR: denies chest pain or palpitations, denies leg swelling  GI: denies abdominal pain and denies heartburn. Denies nausea, vomiting, diarrhea, constipation  NEURO: denies headaches, dizziness, weakness, syncope  PSYCH: denies anxiety, depression, insomnia    EXAM:   /70   Pulse 78   Temp 98.3 °F (36.8 °C)   Resp 16   Ht 6' (1.829 m)   Wt 219 lb (99.3 kg)   SpO2 98%   BMI 29.70 kg/m²   GENERAL: well developed, well nourished,in no apparent distress  SKIN: no rashes,no suspicious lesions, warm and dry  HEENT: atraumatic, normocephalic,ears and throat are clear  NECK: supple,no adenopathy, no thyromegaly  LUNGS: clear to auscultation b/l no W/R/R  CARDIO: RRR without murmur  GI: good BS's,no masses, HSM, distension or tenderness  EXTREMITIES: no cyanosis, clubbing or edema  MUSCULOSKELETAL: FROM, no joint swelling or bony tenderness  NEURO: a/ox3, no focal deficits  PSYCH: mood and affect normal    ASSESSMENT AND PLAN:     Encounter Diagnosis   Name Primary?    Essential hypertension, benign Yes    -controlled; CPM  Requested Prescriptions     Signed Prescriptions Disp Refills    valsartan 320 MG Oral Tab 30 tablet 3     Sig: Take 1 tablet (320 mg total) by mouth daily.         The patient indicates understanding of these issues and agrees to the plan.

## 2025-06-25 DIAGNOSIS — I10 ESSENTIAL HYPERTENSION, BENIGN: ICD-10-CM

## 2025-06-25 RX ORDER — VALSARTAN 320 MG/1
320 TABLET ORAL DAILY
Qty: 90 TABLET | Refills: 0 | Status: SHIPPED | OUTPATIENT
Start: 2025-06-25

## 2025-06-25 NOTE — TELEPHONE ENCOUNTER
Last time medication was refilled 1/6/25  Last office visit  1/6/25  Next office visit due/scheduled   Future Appointments   Date Time Provider Department Center   7/8/2025  8:45 AM Jon Augustin DPM [id number]     Medication failed protocol

## (undated) DEVICE — SCD SLEEVE KNEE HI BLEND

## (undated) DEVICE — SOL  .9 1000ML BTL

## (undated) DEVICE — 3M™ RED DOT™ MONITORING ELECTRODE WITH FOAM TAPE AND STICKY GEL, 50/BAG, 20/CASE, 72/PLT 2570: Brand: RED DOT™

## (undated) DEVICE — ENDOSCOPY PACK - LOWER: Brand: MEDLINE INDUSTRIES, INC.

## (undated) DEVICE — FILTERLINE NASAL ADULT O2/CO2

## (undated) DEVICE — 1200CC GUARDIAN II: Brand: GUARDIAN

## (undated) DEVICE — NON-ADHERENT PAD PREPACK: Brand: TELFA

## (undated) DEVICE — SUTURE VICRYL 2-0 UR-6

## (undated) DEVICE — ABDOMINAL PAD: Brand: DERMACEA

## (undated) DEVICE — STERILE POLYISOPRENE POWDER-FREE SURGICAL GLOVES: Brand: PROTEXIS

## (undated) DEVICE — Device: Brand: DEFENDO AIR/WATER/SUCTION AND BIOPSY VALVE

## (undated) DEVICE — STERILE SYNTHETIC POLYISOPRENE POWDER-FREE SURGICAL GLOVES WITH HYDROGEL COATING: Brand: PROTEXIS

## (undated) DEVICE — 3M(TM) MICROPORE TAPE DISPENSER 1535-2: Brand: 3M™ MICROPORE™

## (undated) DEVICE — PAD ULNAR NERVE PROTECTOR

## (undated) DEVICE — RECTAL CDS-LF: Brand: MEDLINE INDUSTRIES, INC.

## (undated) NOTE — LETTER
Date: 11/13/2024    Patient Name: Zeny Briones          To Whom it may concern:    This letter has been written at the patient's request. The above patient was seen at [facility] for treatment of a medical condition.    This patient should be excused from attending work from 10/31/24 through 11/27/24.          Sincerely,     Armida Treviño PA-C

## (undated) NOTE — LETTER
Date & Time: 8/18/2022, 1:26 PM  Patient: James Rodriguez  Encounter Provider(s):    Maria L Friedman MD       To Whom It May Concern:    Juan Pablo Mark was seen and treated in our department on 8/18/2022. He should not return to work until August 21.     If you have any questions or concerns, please do not hesitate to call.        _____________________________  Physician/APC Signature

## (undated) NOTE — LETTER
Date: 10/1/2020    Patient Name: Merari Gastelum          To Whom it may concern: The above patient was seen at the [facility] for treatment of a medical condition. This patient should be excused from attending work from 10/1/2020.

## (undated) NOTE — LETTER
12/15/2021    Return to School / Work    Name: Missy Sánchez        : 1990    To Whom It May Concern,    Missy Sánchez had surgery on 2021 and is not able to return to work tomorrow on 2021:    Not able to return to school /

## (undated) NOTE — LETTER
21    Patient: Mechelle Menchaca  : 1990 Visit date: 2021    Dear  Abdiel De Anda MD    Thank you for referring Mechelle Menchaca to my practice. Please find my assessment and plan below. I saw Nicolasa Chanle in the office today.

## (undated) NOTE — LETTER
Date: 8/4/2022    Patient Name: Amelia Montana    2/27/1990    To Whom it may concern: The above patient was seen at the [facility] for treatment of a medical condition of right wrist pain. Patient should limit lifting no more than 15 pounds for 2 weeks. The patient should be able to return with no restrictions in 2 weeks. Please contact my office with any questions at [phone].       Sincerely,    RAFAT Vázquez

## (undated) NOTE — LETTER
21    Patient: Kelle Scales  : 1990 Visit date: 11/10/2021    Dear  Maddie Lazo MD    Thank you for referring Kelle Scales to my practice. Please find my assessment and plan below.      General Pleasant male in no acute distre